# Patient Record
Sex: MALE | Race: WHITE | NOT HISPANIC OR LATINO | Employment: OTHER | URBAN - METROPOLITAN AREA
[De-identification: names, ages, dates, MRNs, and addresses within clinical notes are randomized per-mention and may not be internally consistent; named-entity substitution may affect disease eponyms.]

---

## 2023-02-22 ENCOUNTER — HOSPITAL ENCOUNTER (OUTPATIENT)
Dept: LAB | Facility: MEDICAL CENTER | Age: 33
End: 2023-02-22
Attending: STUDENT IN AN ORGANIZED HEALTH CARE EDUCATION/TRAINING PROGRAM
Payer: COMMERCIAL

## 2023-02-22 ENCOUNTER — APPOINTMENT (OUTPATIENT)
Dept: MEDICAL GROUP | Facility: MEDICAL CENTER | Age: 33
End: 2023-02-22
Payer: COMMERCIAL

## 2023-02-22 ENCOUNTER — OFFICE VISIT (OUTPATIENT)
Dept: MEDICAL GROUP | Facility: MEDICAL CENTER | Age: 33
End: 2023-02-22
Payer: COMMERCIAL

## 2023-02-22 VITALS
OXYGEN SATURATION: 97 % | BODY MASS INDEX: 30.37 KG/M2 | WEIGHT: 189 LBS | DIASTOLIC BLOOD PRESSURE: 72 MMHG | HEIGHT: 66 IN | TEMPERATURE: 97.5 F | SYSTOLIC BLOOD PRESSURE: 110 MMHG | HEART RATE: 98 BPM

## 2023-02-22 DIAGNOSIS — Z11.3 ROUTINE SCREENING FOR STI (SEXUALLY TRANSMITTED INFECTION): ICD-10-CM

## 2023-02-22 DIAGNOSIS — Z11.59 ENCOUNTER FOR HEPATITIS C SCREENING TEST FOR LOW RISK PATIENT: ICD-10-CM

## 2023-02-22 DIAGNOSIS — F41.9 ANXIETY DISORDER, UNSPECIFIED TYPE: ICD-10-CM

## 2023-02-22 DIAGNOSIS — E66.9 OBESITY (BMI 30-39.9): ICD-10-CM

## 2023-02-22 DIAGNOSIS — Z78.9 ALCOHOL USE: ICD-10-CM

## 2023-02-22 DIAGNOSIS — F19.91 HISTORY OF DRUG USE: ICD-10-CM

## 2023-02-22 DIAGNOSIS — Z87.891 FORMER SMOKER: ICD-10-CM

## 2023-02-22 DIAGNOSIS — R55 SYNCOPE AND COLLAPSE: ICD-10-CM

## 2023-02-22 DIAGNOSIS — L30.9 ECZEMA, UNSPECIFIED TYPE: Chronic | ICD-10-CM

## 2023-02-22 LAB
ERYTHROCYTE [DISTWIDTH] IN BLOOD BY AUTOMATED COUNT: 42.5 FL (ref 35.9–50)
HCT VFR BLD AUTO: 49.4 % (ref 42–52)
HGB BLD-MCNC: 16.5 G/DL (ref 14–18)
MCH RBC QN AUTO: 29.7 PG (ref 27–33)
MCHC RBC AUTO-ENTMCNC: 33.4 G/DL (ref 33.7–35.3)
MCV RBC AUTO: 88.8 FL (ref 81.4–97.8)
PLATELET # BLD AUTO: 215 K/UL (ref 164–446)
PMV BLD AUTO: 10.2 FL (ref 9–12.9)
RBC # BLD AUTO: 5.56 M/UL (ref 4.7–6.1)
WBC # BLD AUTO: 8.3 K/UL (ref 4.8–10.8)

## 2023-02-22 PROCEDURE — 87591 N.GONORRHOEAE DNA AMP PROB: CPT

## 2023-02-22 PROCEDURE — 86803 HEPATITIS C AB TEST: CPT

## 2023-02-22 PROCEDURE — 87491 CHLMYD TRACH DNA AMP PROBE: CPT

## 2023-02-22 PROCEDURE — 93000 ELECTROCARDIOGRAM COMPLETE: CPT | Performed by: STUDENT IN AN ORGANIZED HEALTH CARE EDUCATION/TRAINING PROGRAM

## 2023-02-22 PROCEDURE — 99204 OFFICE O/P NEW MOD 45 MIN: CPT | Performed by: STUDENT IN AN ORGANIZED HEALTH CARE EDUCATION/TRAINING PROGRAM

## 2023-02-22 PROCEDURE — 36415 COLL VENOUS BLD VENIPUNCTURE: CPT

## 2023-02-22 PROCEDURE — 86780 TREPONEMA PALLIDUM: CPT

## 2023-02-22 PROCEDURE — 87389 HIV-1 AG W/HIV-1&-2 AB AG IA: CPT

## 2023-02-22 PROCEDURE — 84443 ASSAY THYROID STIM HORMONE: CPT

## 2023-02-22 PROCEDURE — 85027 COMPLETE CBC AUTOMATED: CPT

## 2023-02-22 SDOH — ECONOMIC STABILITY: HOUSING INSECURITY
IN THE LAST 12 MONTHS, WAS THERE A TIME WHEN YOU DID NOT HAVE A STEADY PLACE TO SLEEP OR SLEPT IN A SHELTER (INCLUDING NOW)?: NO

## 2023-02-22 SDOH — ECONOMIC STABILITY: HOUSING INSECURITY: IN THE LAST 12 MONTHS, HOW MANY PLACES HAVE YOU LIVED?: 1

## 2023-02-22 SDOH — HEALTH STABILITY: PHYSICAL HEALTH: ON AVERAGE, HOW MANY MINUTES DO YOU ENGAGE IN EXERCISE AT THIS LEVEL?: 0 MIN

## 2023-02-22 SDOH — HEALTH STABILITY: MENTAL HEALTH
STRESS IS WHEN SOMEONE FEELS TENSE, NERVOUS, ANXIOUS, OR CAN'T SLEEP AT NIGHT BECAUSE THEIR MIND IS TROUBLED. HOW STRESSED ARE YOU?: NOT AT ALL

## 2023-02-22 SDOH — ECONOMIC STABILITY: TRANSPORTATION INSECURITY
IN THE PAST 12 MONTHS, HAS THE LACK OF TRANSPORTATION KEPT YOU FROM MEDICAL APPOINTMENTS OR FROM GETTING MEDICATIONS?: NO

## 2023-02-22 SDOH — ECONOMIC STABILITY: FOOD INSECURITY: WITHIN THE PAST 12 MONTHS, YOU WORRIED THAT YOUR FOOD WOULD RUN OUT BEFORE YOU GOT MONEY TO BUY MORE.: NEVER TRUE

## 2023-02-22 SDOH — ECONOMIC STABILITY: TRANSPORTATION INSECURITY
IN THE PAST 12 MONTHS, HAS LACK OF RELIABLE TRANSPORTATION KEPT YOU FROM MEDICAL APPOINTMENTS, MEETINGS, WORK OR FROM GETTING THINGS NEEDED FOR DAILY LIVING?: NO

## 2023-02-22 SDOH — ECONOMIC STABILITY: INCOME INSECURITY: IN THE LAST 12 MONTHS, WAS THERE A TIME WHEN YOU WERE NOT ABLE TO PAY THE MORTGAGE OR RENT ON TIME?: NO

## 2023-02-22 SDOH — ECONOMIC STABILITY: FOOD INSECURITY: WITHIN THE PAST 12 MONTHS, THE FOOD YOU BOUGHT JUST DIDN'T LAST AND YOU DIDN'T HAVE MONEY TO GET MORE.: NEVER TRUE

## 2023-02-22 SDOH — HEALTH STABILITY: PHYSICAL HEALTH: ON AVERAGE, HOW MANY DAYS PER WEEK DO YOU ENGAGE IN MODERATE TO STRENUOUS EXERCISE (LIKE A BRISK WALK)?: 0 DAYS

## 2023-02-22 SDOH — ECONOMIC STABILITY: INCOME INSECURITY: HOW HARD IS IT FOR YOU TO PAY FOR THE VERY BASICS LIKE FOOD, HOUSING, MEDICAL CARE, AND HEATING?: NOT HARD AT ALL

## 2023-02-22 SDOH — ECONOMIC STABILITY: TRANSPORTATION INSECURITY
IN THE PAST 12 MONTHS, HAS LACK OF TRANSPORTATION KEPT YOU FROM MEETINGS, WORK, OR FROM GETTING THINGS NEEDED FOR DAILY LIVING?: NO

## 2023-02-22 ASSESSMENT — SOCIAL DETERMINANTS OF HEALTH (SDOH)
HOW OFTEN DO YOU ATTEND CHURCH OR RELIGIOUS SERVICES?: NEVER
HOW OFTEN DO YOU GET TOGETHER WITH FRIENDS OR RELATIVES?: ONCE A WEEK
HOW OFTEN DO YOU HAVE SIX OR MORE DRINKS ON ONE OCCASION: WEEKLY
HOW OFTEN DO YOU GET TOGETHER WITH FRIENDS OR RELATIVES?: ONCE A WEEK
HOW OFTEN DO YOU ATTEND CHURCH OR RELIGIOUS SERVICES?: NEVER
DO YOU BELONG TO ANY CLUBS OR ORGANIZATIONS SUCH AS CHURCH GROUPS UNIONS, FRATERNAL OR ATHLETIC GROUPS, OR SCHOOL GROUPS?: NO
WITHIN THE PAST 12 MONTHS, YOU WORRIED THAT YOUR FOOD WOULD RUN OUT BEFORE YOU GOT THE MONEY TO BUY MORE: NEVER TRUE
HOW HARD IS IT FOR YOU TO PAY FOR THE VERY BASICS LIKE FOOD, HOUSING, MEDICAL CARE, AND HEATING?: NOT HARD AT ALL
HOW OFTEN DO YOU ATTENT MEETINGS OF THE CLUB OR ORGANIZATION YOU BELONG TO?: NEVER
ARE YOU MARRIED, WIDOWED, DIVORCED, SEPARATED, NEVER MARRIED, OR LIVING WITH A PARTNER?: NEVER MARRIED
HOW OFTEN DO YOU ATTENT MEETINGS OF THE CLUB OR ORGANIZATION YOU BELONG TO?: NEVER
DO YOU BELONG TO ANY CLUBS OR ORGANIZATIONS SUCH AS CHURCH GROUPS UNIONS, FRATERNAL OR ATHLETIC GROUPS, OR SCHOOL GROUPS?: NO
HOW MANY DRINKS CONTAINING ALCOHOL DO YOU HAVE ON A TYPICAL DAY WHEN YOU ARE DRINKING: 10 OR MORE
IN A TYPICAL WEEK, HOW MANY TIMES DO YOU TALK ON THE PHONE WITH FAMILY, FRIENDS, OR NEIGHBORS?: ONCE A WEEK
ARE YOU MARRIED, WIDOWED, DIVORCED, SEPARATED, NEVER MARRIED, OR LIVING WITH A PARTNER?: NEVER MARRIED
HOW OFTEN DO YOU HAVE A DRINK CONTAINING ALCOHOL: 2-4 TIMES A MONTH
IN A TYPICAL WEEK, HOW MANY TIMES DO YOU TALK ON THE PHONE WITH FAMILY, FRIENDS, OR NEIGHBORS?: ONCE A WEEK

## 2023-02-22 ASSESSMENT — LIFESTYLE VARIABLES
HOW OFTEN DO YOU HAVE A DRINK CONTAINING ALCOHOL: 2-4 TIMES A MONTH
HOW OFTEN DO YOU HAVE SIX OR MORE DRINKS ON ONE OCCASION: WEEKLY
SKIP TO QUESTIONS 9-10: 0
AUDIT-C TOTAL SCORE: 9
HOW MANY STANDARD DRINKS CONTAINING ALCOHOL DO YOU HAVE ON A TYPICAL DAY: 10 OR MORE

## 2023-02-22 ASSESSMENT — ANXIETY QUESTIONNAIRES
6. BECOMING EASILY ANNOYED OR IRRITABLE: SEVERAL DAYS
7. FEELING AFRAID AS IF SOMETHING AWFUL MIGHT HAPPEN: SEVERAL DAYS
1. FEELING NERVOUS, ANXIOUS, OR ON EDGE: SEVERAL DAYS
2. NOT BEING ABLE TO STOP OR CONTROL WORRYING: SEVERAL DAYS
IF YOU CHECKED OFF ANY PROBLEMS ON THIS QUESTIONNAIRE, HOW DIFFICULT HAVE THESE PROBLEMS MADE IT FOR YOU TO DO YOUR WORK, TAKE CARE OF THINGS AT HOME, OR GET ALONG WITH OTHER PEOPLE: NOT DIFFICULT AT ALL
4. TROUBLE RELAXING: SEVERAL DAYS
GAD7 TOTAL SCORE: 7
3. WORRYING TOO MUCH ABOUT DIFFERENT THINGS: SEVERAL DAYS
5. BEING SO RESTLESS THAT IT IS HARD TO SIT STILL: SEVERAL DAYS

## 2023-02-22 ASSESSMENT — ENCOUNTER SYMPTOMS
HEADACHES: 0
WHEEZING: 0
SHORTNESS OF BREATH: 0
CHILLS: 0
VOMITING: 0
PALPITATIONS: 0
DIZZINESS: 0
FEVER: 0
NAUSEA: 0
WEIGHT LOSS: 0

## 2023-02-22 ASSESSMENT — PATIENT HEALTH QUESTIONNAIRE - PHQ9: CLINICAL INTERPRETATION OF PHQ2 SCORE: 0

## 2023-02-22 NOTE — PROGRESS NOTES
Subjective:     CC:  Diagnoses of Eczema, unspecified type, Routine screening for STI (sexually transmitted infection), Encounter for hepatitis C screening test for low risk patient, Alcohol use, Former smoker, Anxiety disorder, unspecified type, Obesity (BMI 30-39.9), Syncope and collapse, and History of drug use were pertinent to this visit.    HISTORY OF THE PRESENT ILLNESS: Patient is a 32 y.o. male. This pleasant patient is here today to establish care and discuss     Problem   Eczema   Alcohol Use    This is chronic issue  He report he has been drink 12 beer x 2 years since 2021.  Denies jaundice, scleral icterus  Report nonspecific rare abdominal pain days after drinking in [place]  Denies diarrhea or constipation, denies blood in stool or unexpected weightloss     Obesity (Bmi 30-39.9)   Syncope and Collapse    - Report symptoms of fainting 2 weeks ago after doing cocaine 2 weeks ago.   - ROS: denies DORAN, edema  - no further episodes.      Former Smoker    5 pack year, quit smoking around 2021  Report rarely smokes 1-2 puffs socially      Anxiety Disorder    This is a chronic condition.  SHENG-7:  7  PHQ: 0  TSH: none to review  Denies psychotic features, denies personal history of PTSD or manic episodes.   Suicidal ideation/homicidal ideation: Denies  Therapy/counseling:  Medications tried:  - previously been on SSRi at age 21             Health Maintenance: rec vaccine    ROS:   Review of Systems   Constitutional:  Negative for chills, fever and weight loss.   HENT:  Negative for hearing loss.    Respiratory:  Negative for shortness of breath and wheezing.    Cardiovascular:  Negative for chest pain and palpitations.   Gastrointestinal:  Negative for nausea and vomiting.   Genitourinary:  Negative for frequency and urgency.   Skin:  Negative for rash.   Neurological:  Negative for dizziness and headaches.       Objective:       Exam: /72 (BP Location: Right arm, Patient Position: Sitting, BP Cuff Size:  "Adult)   Pulse 98   Temp 36.4 °C (97.5 °F) (Temporal)   Ht 1.676 m (5' 6\")   Wt 85.7 kg (189 lb)   SpO2 97%  Body mass index is 30.51 kg/m².    Physical Exam  Constitutional:       Appearance: Normal appearance.   Cardiovascular:      Rate and Rhythm: Normal rate and regular rhythm.   Pulmonary:      Effort: Pulmonary effort is normal.      Breath sounds: Normal breath sounds.   Musculoskeletal:      Cervical back: Normal range of motion and neck supple.   Lymphadenopathy:      Cervical: No cervical adenopathy.   Neurological:      Mental Status: He is alert.         Labs:     Assessment & Plan:   32 y.o. male with the following -  1. Syncope and collapse  Acute, undiagnosed uncertain etiology  Patient reports 2 weeks prior to this visit he had an episode of syncope event after using cocaine.  EKG in clinic showed sinus rhythm at rate in 70s,  QTc 431, no ST elevation no Q waves no preexcitation changes noted.  Etiology of syncope likely secondary to dehydration/and fatigue after using cocaine as his syncope/clots after he was out for 24 hours  Plan  - CBC WITHOUT DIFFERENTIAL; Future  - Comp Metabolic Panel; Future  - Lipid Profile; Future  - EKG - Clinic Performed  - EC-ECHOCARDIOGRAM COMPLETE W/O CONT; Future  - TSH WITH REFLEX TO FT4; Future    2. Alcohol use  Chronic, uncontrolled  Counseled to decrease  Plan  Obtain labs for restratification    3. Anxiety disorder, unspecified type  Chronic, stable  SHENG-7  Denies SI HI  Plan:  Continue to monitor  - CBC WITHOUT DIFFERENTIAL; Future  - Comp Metabolic Panel; Future  - Lipid Profile; Future    4. Obesity (BMI 30-39.9)  Chronic, stable  - Patient identified as having weight management issue.  Appropriate orders and counseling given.  - TSH WITH REFLEX TO FT4; Future    5. Routine screening for STI (sexually transmitted infection)    - Chlamydia/GC, PCR (Urine); Future  - T.PALLIDUM AB DIANNA (SCREENING); Future  - HIV AG/AB COMBO ASSAY SCREENING; " Future    6. Encounter for hepatitis C screening test for low risk patient    - HEP C VIRUS ANTIBODY; Future    7. Former smoker  History of    8. Eczema, unspecified type  History of    9. History of drug use  History of            Return in about 6 weeks (around 4/5/2023) for Lab review.    Please note that this dictation was created using voice recognition software. I have made every reasonable attempt to correct obvious errors, but I expect that there are errors of grammar and possibly content that I did not discover before finalizing the note.

## 2023-02-23 LAB
C TRACH DNA SPEC QL NAA+PROBE: NEGATIVE
HCV AB SER QL: NORMAL
HIV 1+2 AB+HIV1 P24 AG SERPL QL IA: NORMAL
N GONORRHOEA DNA SPEC QL NAA+PROBE: NEGATIVE
SPECIMEN SOURCE: NORMAL
T PALLIDUM AB SER QL IA: NORMAL
TSH SERPL DL<=0.005 MIU/L-ACNC: 2.26 UIU/ML (ref 0.38–5.33)